# Patient Record
Sex: MALE | Race: WHITE | NOT HISPANIC OR LATINO | Employment: OTHER | RURAL
[De-identification: names, ages, dates, MRNs, and addresses within clinical notes are randomized per-mention and may not be internally consistent; named-entity substitution may affect disease eponyms.]

---

## 2019-09-04 ENCOUNTER — HISTORICAL (OUTPATIENT)
Dept: ADMINISTRATIVE | Facility: HOSPITAL | Age: 59
End: 2019-09-04

## 2019-09-06 LAB
LAB AP CLINICAL INFORMATION: NORMAL
LAB AP COMMENTS: NORMAL
LAB AP DIAGNOSIS - HISTORICAL: NORMAL
LAB AP GROSS PATHOLOGY - HISTORICAL: NORMAL
LAB AP SPECIMEN SUBMITTED - HISTORICAL: NORMAL

## 2021-10-08 ENCOUNTER — OFFICE VISIT (OUTPATIENT)
Dept: UROLOGY | Facility: CLINIC | Age: 61
End: 2021-10-08
Payer: OTHER GOVERNMENT

## 2021-10-08 VITALS
BODY MASS INDEX: 28.44 KG/M2 | HEIGHT: 72 IN | HEART RATE: 72 BPM | TEMPERATURE: 99 F | OXYGEN SATURATION: 99 % | WEIGHT: 210 LBS | DIASTOLIC BLOOD PRESSURE: 80 MMHG | SYSTOLIC BLOOD PRESSURE: 110 MMHG

## 2021-10-08 DIAGNOSIS — Z12.5 ENCOUNTER FOR PROSTATE CANCER SCREENING: ICD-10-CM

## 2021-10-08 DIAGNOSIS — N40.1 BPH WITH OBSTRUCTION/LOWER URINARY TRACT SYMPTOMS: ICD-10-CM

## 2021-10-08 DIAGNOSIS — R30.0 DYSURIA: ICD-10-CM

## 2021-10-08 DIAGNOSIS — R97.20 ELEVATED PROSTATE SPECIFIC ANTIGEN (PSA): Primary | ICD-10-CM

## 2021-10-08 DIAGNOSIS — N13.8 BPH WITH OBSTRUCTION/LOWER URINARY TRACT SYMPTOMS: ICD-10-CM

## 2021-10-08 DIAGNOSIS — K64.4 INFLAMED EXTERNAL HEMORRHOID: ICD-10-CM

## 2021-10-08 LAB
BACTERIA #/AREA URNS HPF: NORMAL /HPF
BILIRUB UR QL STRIP: NEGATIVE
CLARITY UR: CLEAR
COLOR UR: YELLOW
GLUCOSE UR STRIP-MCNC: NEGATIVE MG/DL
KETONES UR STRIP-SCNC: NEGATIVE MG/DL
LEUKOCYTE ESTERASE UR QL STRIP: NEGATIVE
NITRITE UR QL STRIP: NEGATIVE
PH UR STRIP: 5 PH UNITS
PROT UR QL STRIP: NEGATIVE
PSA SERPL-MCNC: 5.19 NG/ML (ref 0–4.1)
RBC # UR STRIP: ABNORMAL /UL
RBC #/AREA URNS HPF: NORMAL /HPF
SP GR UR STRIP: 1.02
UROBILINOGEN UR STRIP-ACNC: 0.2 MG/DL
WBC #/AREA URNS HPF: NORMAL /HPF

## 2021-10-08 PROCEDURE — 81001 URINALYSIS AUTO W/SCOPE: CPT | Mod: ,,, | Performed by: CLINICAL MEDICAL LABORATORY

## 2021-10-08 PROCEDURE — 99204 OFFICE O/P NEW MOD 45 MIN: CPT | Mod: PBBFAC | Performed by: NURSE PRACTITIONER

## 2021-10-08 PROCEDURE — 99214 PR OFFICE/OUTPT VISIT, EST, LEVL IV, 30-39 MIN: ICD-10-PCS | Mod: S$PBB,,, | Performed by: NURSE PRACTITIONER

## 2021-10-08 PROCEDURE — G0103 PSA, SCREENING: ICD-10-PCS | Mod: ,,, | Performed by: CLINICAL MEDICAL LABORATORY

## 2021-10-08 PROCEDURE — 99214 OFFICE O/P EST MOD 30 MIN: CPT | Mod: S$PBB,,, | Performed by: NURSE PRACTITIONER

## 2021-10-08 PROCEDURE — 36415 COLL VENOUS BLD VENIPUNCTURE: CPT | Mod: ,,, | Performed by: CLINICAL MEDICAL LABORATORY

## 2021-10-08 PROCEDURE — 81001 URINALYSIS, REFLEX TO URINE CULTURE: ICD-10-PCS | Mod: ,,, | Performed by: CLINICAL MEDICAL LABORATORY

## 2021-10-08 PROCEDURE — 36415 PR COLLECTION VENOUS BLOOD,VENIPUNCTURE: ICD-10-PCS | Mod: ,,, | Performed by: CLINICAL MEDICAL LABORATORY

## 2021-10-08 PROCEDURE — G0103 PSA SCREENING: HCPCS | Mod: ,,, | Performed by: CLINICAL MEDICAL LABORATORY

## 2021-10-08 PROCEDURE — 51798 US URINE CAPACITY MEASURE: CPT | Mod: PBBFAC | Performed by: NURSE PRACTITIONER

## 2021-10-08 RX ORDER — LISINOPRIL 10 MG/1
TABLET ORAL
COMMUNITY
Start: 2021-09-06

## 2021-10-08 RX ORDER — VIBEGRON 75 MG/1
75 TABLET, FILM COATED ORAL NIGHTLY
Qty: 30 TABLET | Refills: 11 | Status: SHIPPED | OUTPATIENT
Start: 2021-10-08 | End: 2021-10-29 | Stop reason: ALTCHOICE

## 2021-10-08 RX ORDER — ATORVASTATIN CALCIUM 20 MG/1
TABLET, FILM COATED ORAL
COMMUNITY
Start: 2021-08-06

## 2021-10-08 RX ORDER — TAMSULOSIN HYDROCHLORIDE 0.4 MG/1
CAPSULE ORAL
COMMUNITY
Start: 2021-08-06 | End: 2021-10-26

## 2021-10-13 ENCOUNTER — PATIENT MESSAGE (OUTPATIENT)
Dept: UROLOGY | Facility: CLINIC | Age: 61
End: 2021-10-13
Payer: OTHER GOVERNMENT

## 2021-10-13 ENCOUNTER — TELEPHONE (OUTPATIENT)
Dept: UROLOGY | Facility: CLINIC | Age: 61
End: 2021-10-13

## 2021-10-13 DIAGNOSIS — R97.20 ELEVATED PROSTATE SPECIFIC ANTIGEN (PSA): Primary | ICD-10-CM

## 2021-10-14 ENCOUNTER — TELEPHONE (OUTPATIENT)
Dept: UROLOGY | Facility: CLINIC | Age: 61
End: 2021-10-14

## 2021-10-21 ENCOUNTER — TELEPHONE (OUTPATIENT)
Dept: DIABETES SERVICES | Facility: CLINIC | Age: 61
End: 2021-10-21

## 2021-10-29 ENCOUNTER — TELEPHONE (OUTPATIENT)
Dept: UROLOGY | Facility: CLINIC | Age: 61
End: 2021-10-29
Payer: OTHER GOVERNMENT

## 2021-10-29 DIAGNOSIS — N13.8 BPH WITH OBSTRUCTION/LOWER URINARY TRACT SYMPTOMS: Primary | ICD-10-CM

## 2021-10-29 DIAGNOSIS — N40.1 BPH WITH OBSTRUCTION/LOWER URINARY TRACT SYMPTOMS: Primary | ICD-10-CM

## 2021-10-29 RX ORDER — MIRABEGRON 50 MG/1
50 TABLET, FILM COATED, EXTENDED RELEASE ORAL DAILY
Qty: 90 TABLET | Refills: 3 | Status: SHIPPED | OUTPATIENT
Start: 2021-10-29 | End: 2021-11-10 | Stop reason: ALTCHOICE

## 2021-11-05 ENCOUNTER — TELEPHONE (OUTPATIENT)
Dept: UROLOGY | Facility: CLINIC | Age: 61
End: 2021-11-05
Payer: OTHER GOVERNMENT

## 2021-11-10 ENCOUNTER — PATIENT MESSAGE (OUTPATIENT)
Dept: UROLOGY | Facility: CLINIC | Age: 61
End: 2021-11-10
Payer: OTHER GOVERNMENT

## 2021-11-10 DIAGNOSIS — N13.8 BPH WITH OBSTRUCTION/LOWER URINARY TRACT SYMPTOMS: ICD-10-CM

## 2021-11-10 DIAGNOSIS — N40.1 BPH WITH OBSTRUCTION/LOWER URINARY TRACT SYMPTOMS: ICD-10-CM

## 2021-11-10 DIAGNOSIS — N32.81 OVERACTIVE BLADDER: Primary | ICD-10-CM

## 2021-11-10 RX ORDER — VIBEGRON 75 MG/1
75 TABLET, FILM COATED ORAL DAILY
Qty: 90 TABLET | Refills: 3 | Status: SHIPPED | OUTPATIENT
Start: 2021-11-10 | End: 2022-04-28

## 2021-12-06 ENCOUNTER — OFFICE VISIT (OUTPATIENT)
Dept: UROLOGY | Facility: CLINIC | Age: 61
End: 2021-12-06
Payer: OTHER GOVERNMENT

## 2021-12-06 VITALS
TEMPERATURE: 99 F | WEIGHT: 210 LBS | BODY MASS INDEX: 28.44 KG/M2 | OXYGEN SATURATION: 97 % | HEART RATE: 80 BPM | DIASTOLIC BLOOD PRESSURE: 75 MMHG | SYSTOLIC BLOOD PRESSURE: 122 MMHG | HEIGHT: 72 IN

## 2021-12-06 DIAGNOSIS — R97.20 ELEVATED PROSTATE SPECIFIC ANTIGEN (PSA): ICD-10-CM

## 2021-12-06 DIAGNOSIS — N13.8 BPH WITH OBSTRUCTION/LOWER URINARY TRACT SYMPTOMS: Primary | ICD-10-CM

## 2021-12-06 DIAGNOSIS — N40.1 BPH WITH OBSTRUCTION/LOWER URINARY TRACT SYMPTOMS: Primary | ICD-10-CM

## 2021-12-06 PROCEDURE — 99214 PR OFFICE/OUTPT VISIT, EST, LEVL IV, 30-39 MIN: ICD-10-PCS | Mod: S$PBB,,, | Performed by: NURSE PRACTITIONER

## 2021-12-06 PROCEDURE — 99214 OFFICE O/P EST MOD 30 MIN: CPT | Mod: S$PBB,,, | Performed by: NURSE PRACTITIONER

## 2021-12-06 PROCEDURE — 99214 OFFICE O/P EST MOD 30 MIN: CPT | Mod: PBBFAC | Performed by: NURSE PRACTITIONER

## 2022-01-03 ENCOUNTER — PATIENT MESSAGE (OUTPATIENT)
Dept: UROLOGY | Facility: CLINIC | Age: 62
End: 2022-01-03
Payer: OTHER GOVERNMENT

## 2022-01-12 ENCOUNTER — OFFICE VISIT (OUTPATIENT)
Dept: UROLOGY | Facility: CLINIC | Age: 62
End: 2022-01-12
Payer: OTHER GOVERNMENT

## 2022-01-12 VITALS
HEIGHT: 72 IN | TEMPERATURE: 99 F | OXYGEN SATURATION: 99 % | SYSTOLIC BLOOD PRESSURE: 112 MMHG | WEIGHT: 220 LBS | HEART RATE: 66 BPM | BODY MASS INDEX: 29.8 KG/M2 | DIASTOLIC BLOOD PRESSURE: 70 MMHG

## 2022-01-12 DIAGNOSIS — N13.8 BPH WITH OBSTRUCTION/LOWER URINARY TRACT SYMPTOMS: Primary | ICD-10-CM

## 2022-01-12 DIAGNOSIS — R97.20 ELEVATED PROSTATE SPECIFIC ANTIGEN (PSA): ICD-10-CM

## 2022-01-12 DIAGNOSIS — N40.1 BPH WITH OBSTRUCTION/LOWER URINARY TRACT SYMPTOMS: Primary | ICD-10-CM

## 2022-01-12 PROCEDURE — 99214 PR OFFICE/OUTPT VISIT, EST, LEVL IV, 30-39 MIN: ICD-10-PCS | Mod: S$PBB,,, | Performed by: NURSE PRACTITIONER

## 2022-01-12 PROCEDURE — 99214 OFFICE O/P EST MOD 30 MIN: CPT | Mod: S$PBB,,, | Performed by: NURSE PRACTITIONER

## 2022-01-12 PROCEDURE — 51798 US URINE CAPACITY MEASURE: CPT | Mod: PBBFAC | Performed by: NURSE PRACTITIONER

## 2022-01-12 PROCEDURE — 99213 OFFICE O/P EST LOW 20 MIN: CPT | Mod: PBBFAC | Performed by: NURSE PRACTITIONER

## 2022-01-12 NOTE — PROGRESS NOTES
"Subjective:       Patient ID: Flako Koch is a 61 y.o. male.    Chief Complaint: Other (3 month f/u for BPH with LUTS--did not bring meds)    PAST UROLOGICAL HISTORY:  UROLOGICAL HISTORY  56 yo newly referred male here with voiding complaints x 6 months.  States he was initially placed on alfuzosin for nocturia, but  became ineffective and he was changed to flomax by his PMD.  PVR 105ML on arrival.  Last urine culture negative for infection;  PSA 11/28/17 2.3.  AUA-SI score of 8.  Denies fever, chills or constitutional sxs.  No significant findings on urine dip.  (March 6, 2018)    Mr. Koch is here for his one month f/u appt after treatment for acute prostatitis.  He finished one-month course of Cipro 500mg as directed and repeat PSA  improved from 3.340 to 2.800.  Flomax started last visit.  He states sxs have improved about 50%, as nocturia down to 2 from 4.  However he presents today with 1+ hematuria which he did not have last visit.  (April 9, 2018)    Patient returns today for one month f/u appt for continued tx of exacerbation of his prostatitis.  He improved "50%" with month of Cipro.  He has just finished another month of antx (Minocycline) without additional improvement.  Hematuria appears to have cleared.  Nocturia unchanged, frequency manageable during the day with urgency.  Urination is aggrevating more than painful.  Denies  pain other than back pain.  He agrees to cystoscopy for further evaluation.  PVR 20 ml today.  (May 16, 2018)    Mr. Koch is here today for his scheduled cystoscopy with Dr. Johnson for microhematuria and evaluation of BPH with BLEVINS.  He has had two month-courses of antx (cipro and minocycline) for his probable chronic prostatitism, and Myrbetriq trial started last visit has been effective for him.  Dr. Johnson to room to speak with patient.  (June 20, 2018)    Mr. Koch is here today for his routine yearly prostate screening.  PSA is still pending.  Cystoscopy at last " "appt resulted chronic prostatism as the most likely cause of his microhematuria.  He has some middle lobe and overall prostate enlargement.  He will be candidate for TURP when and if meds become ineffective.  He has continued to take Flomax and Myrbetriq 50mg daily.  (6/26/2019)    Mr. Koch is here today for recheck elevated PSA of 5.840.  He was treated for a probable exacerbation of his chronic prostatitis with Bactrim DS for one month.  He did not get his PSA prior to visit today, but will on his way out.  He denies changes in voiding following tx, and denies pain of any sort.  His urinary sxs are well-controlled on Myrbetriq and Flomax at this point.  He is hypotensive this morning, but denies any symptoms and states he "actually feels pretty good".  (Aug. 8, 2019)    Mr. Koch is here today for 6 month f/u for elevated PSA.  Prostate biopsies performed by Dr. Johnson 9/4/19 showed BPH only.  Last PSA performed 8/8/2019 was 6.130.  Patient states he feels well, and voiding is controlled.  He ran out of Myrbetriq about 1 week ago, but states he is doing well without it, and questions if it is necessary anymore.  Patient is retiring in May, and will be changing his insurance to [employer].  He requests refills.  (March 12, 2020)-----------------------------------------------------------------    Mr. Koch has rtc today for his routine prostate screening and 6 month f/u PSA for elevation.  He has hx of trilobar BPH per prostate biopsies, and is currently controlled on  Flomax daily.  He states he discontinued Myrbetriq 50mg when he found that it was not covered by his insurance, but experienced no change in urination after doing so.  Also denies new sxs or changes since last visit.  (July 14, 2020)-----------------------------------------------------    Mr. Koch has rtc today for annual prostate screening.  He has a history of known BPH with LUTS per prostate biopsies for chronically elevated PSA 9/4/2019.  " He did not get PSA prior to visit today, but will on his way out.  He states flomax daily is not working as well as it had in the past.  IPSS 7, which reflects c/o frequency and nocturia.  PVR 47 ml.  Prostate exam was asymptomatic for prostatitis, but he has significant internal and external hemorrhoids on exam.    Past PSA Results   PSA WAS 3.310 03/12/2019  PSA was 6.130 on August 8, 2019  PSA was 5.480 on 06/269/2019  2.800 onn 04/09/2018  2.3 on 11/28/2017  3.340 on 09/20/2017  2.620 on 10/10/2016  (10/8/2021)--------------------------------------------------------------------------------------    Mr. Koch has returned to clinic today to discuss MRI Prostate results and proceeding POC.  He reports that Gemtesa has improved his nocturia and OAB symproms slightly.  Would like to finish his trial to give it more time.  PI-RADS 2, which translates to low clinically significant cancer likely to be present.  No biopsies recommended, but he has large prostate with chronic bladder outlet obstruction.  Patient is not interested in Proscar r/t side effects, and is not a candidate for Urolift, but has interest in TURP.  He would like to wait until after the holidays however.  He denies sensation of infection today.        6/20/2018 Procedure Performed by Dr. Johnson  Flexible cystoscopy    Preoperative diagnosis:  Microscopic hematuria undetermined etiology    Postoperative diagnosis:  Microscopic hematuria secondary to benign prostatic hyperplasia with chronic prostatitis    Description:  The patient was placed in the supine position in the clinic cystoscopy room. He was prepared for flexible cystoscopy. Urethra was anesthetized with lidocaine jelly. No sedation was given. The 16.5 [place] Olympus flexible digital cystoscope was passed transurethrally into the bladder. Anterior urethra was clear. Posterior urethra was extremely inflamed beginning at the external sphincter extending all the way into the bladder neck.  There was a lot of enlargement of the prostate with trilobar overgrowth with some middle lobe hyperplasia and high sitting bladder neck. Entire prostatic urethra was extremely inflamed. There did appear to be obstruction from the prostate. The total length of the prostate was felt to be about 3.5 cm with functional length of 2.5 cm. Bladder was moderately trabeculated. There were a lot of petechial changes on the posterior wall. Orifices were difficult to see looking forward but were easily seen in retrograde fashion. Retrograde viewing of the bladder neck revealed some intravesical extension of the middle lobe but it did not seem to be excessively large. Bladder capacity was in excess of 300 mL. I did not see any evidence of tumors, stones, or diverticula or any other cause for the hematuria other than the inflammatory change of the prostatic urethra and the petechial changes in the bladder. The scope was removed and again the inflammation and enlargement of prostatic urethra noted. Patient was returned to the regular exam room in satisfactory condition. There were no complications. I feel that he has benign hematuria but does have a very inflamed and somewhat enlarged prostate.  (12/6/2021)-----------------------------------------------------------------------------------    Mr. Koch is here today for f/u uncontrolled BPH with LUTS elevated PSA.  He originally made appt to meet with Dr Johnson and schedule recommended TURP, but after lengthy discussion would like to take more time before scheduling.  All questions answered, and patient sent with patient education on procedure and aftercare.  He states he will call us back.  He denies urological complaints at this time.      -  Elevated prostate specific antigen (PSA)  MRI Prostate resulted .  PI-RADS version 2.1 score: 2.  Findings of BPH. Some trabeculation of the urinary bladder wall which can be seen in the setting of chronic outlet obstruction.   (1/12/2021)------------------------------------------------------        Review of Systems   Constitutional: Negative.    Respiratory: Negative.    Cardiovascular: Negative.    Gastrointestinal: Negative.    Genitourinary: Positive for difficulty urinating, frequency and urgency. Negative for decreased urine volume, dysuria, enuresis, flank pain, genital sores, hematuria, penile discharge, penile pain, penile swelling, scrotal swelling and testicular pain.        Nocturia x 3   Musculoskeletal: Negative.    Skin: Negative.    Allergic/Immunologic: Negative.    Neurological: Negative.        Objective:      Physical Exam  Vitals and nursing note reviewed.   Constitutional:       General: He is not in acute distress.     Appearance: Normal appearance. He is not ill-appearing, toxic-appearing or diaphoretic.   Eyes:      General: No scleral icterus.  Cardiovascular:      Rate and Rhythm: Normal rate.   Pulmonary:      Effort: Pulmonary effort is normal.   Abdominal:      General: There is no distension.      Palpations: Abdomen is soft.      Tenderness: There is no abdominal tenderness. There is guarding. There is no right CVA tenderness or left CVA tenderness.   Musculoskeletal:         General: Normal range of motion.   Skin:     General: Skin is warm and dry.      Coloration: Skin is not jaundiced or pale.      Findings: No rash.   Neurological:      General: No focal deficit present.      Mental Status: He is alert and oriented to person, place, and time. Mental status is at baseline.   Psychiatric:         Mood and Affect: Mood normal.         Behavior: Behavior normal.         Thought Content: Thought content normal.         Judgment: Judgment normal.         Assessment:       1. BPH with obstruction/lower urinary tract symptoms    2. Elevated prostate specific antigen (PSA)        Plan:       BPH with obstruction/lower urinary tract symptoms  · Dr. Johnson to room to discuss TURP procedure.  Patient elects to take  home patient education and review with his spouse before making decision.    · Continue flomax qhs    Elevated prostate specific antigen (PSA)  MRI performed PI RADS 2  No Bx indicated

## 2022-01-12 NOTE — PATIENT INSTRUCTIONS
Patient Education       Transurethral Resection of the Prostate Discharge Instructions   About this topic   The prostate is a gland at the base of the bladder. Sometimes, the prostate may become too large. This makes it hard for urine to flow out of the bladder opening. Your doctor can remove the inner part of the prostate to make passing urine easier. This procedure is a transurethral resection of the prostate or TURP. TURP removes part of the prostate gland.     What care is needed at home?   · Ask your doctor what you need to do when you go home. Make sure you ask questions if you do not understand what you need to do.  · You need to limit your activity and rest after the procedure for 1 to 2 days. Your doctor will tell you when you can return to your normal activity level.  · You need to drink 6 to 8 glasses of water each day. Drinking water is very important if your urine becomes red or you pass blood clots. Red urine means your treated area is bleeding.  · Your doctor will give you drugs to prevent infection. Follow the instructions given to you with the drugs.  · You need to keep your penis clean to prevent infection. Wash your penis with soap and water at least two times each day.  · Avoid straining or lifting heavy objects until your doctor tells you everything is healed.  What follow-up care is needed?   · Your doctor may ask you to make visits to the office to check on your progress. Be sure to keep your visits.  · Your doctor may send the cut tissue to a lab for testing. Ask your doctor when you can get the results.  · The urine catheter will be removed. Do not try to take your catheter out by yourself.  What drugs may be needed?   The doctor may order drugs to:  · Relieve spasms  · Prevent infection  · Control your pain  Will physical activity be limited?   · You will need to limit your activity while the urine catheter is in place. Talk to your doctor about when you can go back to work and  driving.  · You need to limit your sexual activity after the procedure for about 4 to 6 weeks.  · Try to go for regular short walks.  · Do not lift things over 10 pounds (4.5 kg).  What changes to diet are needed?   · Eat foods high in fiber like fruit, bran, cereal, and beans. Fiber will help prevent hard stools and straining.  · Avoid coffee, soft drinks, and beer, wine, or mixed drinks (alcohol).  What problems could happen?   · Infection. Urinary tract infection is the most common.  · Passing the semen into the bladder instead of out through the urethra  · Injury to the urethra, causing growth of scar tissue  · Loss of fertility. If you plan on having children, talk to your doctor before your procedure.  · Problem with urine control, which is rarely long-lasting.  · Need for another TURP procedure, either because the signs never improved or simply return over time as the prostate continues to get bigger.  · Problem with erection, which is rare.  · Bleeding or small blood clots in the urine  · A hole in the bladder  · Reduced sexual activity  · Blood clots  When do I need to call the doctor?   · Signs of infection such as a fever of 100.4°F (38°C) or higher, chills, mouth sores, wound that will not heal, or anal itching or pain.  · Very bad pain in the belly that cannot be treated by pain relievers  · Not able to drink or eat  · Problems with your urine such as thick, yellow, green, or milky drainage; burning feeling when you pass urine; little urine or no urine at all; urine smells bad.  · Passing large clots the size of a quarter or worsening blood in the urine  · Unable to pass urine and a feeling of fullness  · You are not feeling better in 2 to 3 days or you are feeling worse  If you go home with a tube in your urethra, call your doctor for these signs:  · Worsening pain near the catheter  · Leaking urine  · Not passing urine  · More blood in your urine  · Tube seems blocked or you see grit or stones in your  urine  · Catheter falls out  Helpful tips   · Always keep the tube's drainage bag below the level of your bladder.  · Avoid loops in catheter's drainage tubing.  · You may take showers. Do not take baths until the tube is removed.  Teach Back: Helping You Understand   The Teach Back Method helps you understand the information we are giving you. After you talk with the staff, tell them in your own words what you learned. This helps to make sure the staff has described each thing clearly. It also helps to explain things that may have been confusing. Before going home, make sure you can do these:  · I can tell you about my procedure.  · I can tell you how to care for my catheter, if I have one.  · I can tell you what I will do if I have a fever, chills, or problems with my catheter or urine.  Where can I learn more?   [employer]  https://www.baus.org.uk/_userfiles/pages/files/Patients/Leaflets/TURP%20for%20benign.pdf   [facility] Choices  http://www.nhs.uk/Conditions/resectionoftheprostate/Pages/Recovery.aspx   Last Reviewed Date   2021-09-10  Consumer Information Use and Disclaimer   This information is not specific medical advice and does not replace information you receive from your health care provider. This is only a brief summary of general information. It does NOT include all information about conditions, illnesses, injuries, tests, procedures, treatments, therapies, discharge instructions or life-style choices that may apply to you. You must talk with your health care provider for complete information about your health and treatment options. This information should not be used to decide whether or not to accept your health care providers advice, instructions or recommendations. Only your health care provider has the knowledge and training to provide advice that is right for you.  Copyright   Copyright © 2021 [employer] and its affiliates and/or licensors. All rights reserved.

## 2022-01-13 NOTE — ASSESSMENT & PLAN NOTE
· Dr. Johnson to room to discuss TURP procedure.  Patient elects to take home patient education and review with his spouse before making decision.    · Continue flomax qhs

## 2022-04-13 ENCOUNTER — PATIENT MESSAGE (OUTPATIENT)
Dept: UROLOGY | Facility: CLINIC | Age: 62
End: 2022-04-13
Payer: OTHER GOVERNMENT

## 2022-04-28 ENCOUNTER — OFFICE VISIT (OUTPATIENT)
Dept: UROLOGY | Facility: CLINIC | Age: 62
End: 2022-04-28
Payer: OTHER GOVERNMENT

## 2022-04-28 VITALS
OXYGEN SATURATION: 97 % | BODY MASS INDEX: 29.8 KG/M2 | SYSTOLIC BLOOD PRESSURE: 111 MMHG | HEART RATE: 79 BPM | WEIGHT: 220 LBS | DIASTOLIC BLOOD PRESSURE: 69 MMHG | HEIGHT: 72 IN | TEMPERATURE: 98 F

## 2022-04-28 DIAGNOSIS — N40.1 BPH WITH OBSTRUCTION/LOWER URINARY TRACT SYMPTOMS: ICD-10-CM

## 2022-04-28 DIAGNOSIS — N13.8 BPH WITH OBSTRUCTION/LOWER URINARY TRACT SYMPTOMS: ICD-10-CM

## 2022-04-28 DIAGNOSIS — R97.20 ELEVATED PROSTATE SPECIFIC ANTIGEN (PSA): Primary | ICD-10-CM

## 2022-04-28 PROBLEM — K64.4 INFLAMED EXTERNAL HEMORRHOID: Status: RESOLVED | Noted: 2021-10-08 | Resolved: 2022-04-28

## 2022-04-28 PROCEDURE — 99214 OFFICE O/P EST MOD 30 MIN: CPT | Mod: S$PBB,,, | Performed by: NURSE PRACTITIONER

## 2022-04-28 PROCEDURE — 99214 PR OFFICE/OUTPT VISIT, EST, LEVL IV, 30-39 MIN: ICD-10-PCS | Mod: S$PBB,,, | Performed by: NURSE PRACTITIONER

## 2022-04-28 PROCEDURE — 99214 OFFICE O/P EST MOD 30 MIN: CPT | Mod: PBBFAC | Performed by: NURSE PRACTITIONER

## 2022-04-28 RX ORDER — DICLOFENAC SODIUM 75 MG/1
1 TABLET, DELAYED RELEASE ORAL DAILY
COMMUNITY
Start: 2022-03-23

## 2022-04-28 NOTE — PROGRESS NOTES
"Subjective:       Patient ID: Flako Koch is a 61 y.o. male.    Chief Complaint: Follow-up    PAST UROLOGICAL HISTORY:  UROLOGICAL HISTORY  58 yo newly referred male here with voiding complaints x 6 months.  States he was initially placed on alfuzosin for nocturia, but  became ineffective and he was changed to flomax by his PMD.  PVR 105ML on arrival.  Last urine culture negative for infection;  PSA 11/28/17 2.3.  AUA-SI score of 8.  Denies fever, chills or constitutional sxs.  No significant findings on urine dip.  (March 6, 2018)    Mr. Koch is here for his one month f/u appt after treatment for acute prostatitis.  He finished one-month course of Cipro 500mg as directed and repeat PSA  improved from 3.340 to 2.800.  Flomax started last visit.  He states sxs have improved about 50%, as nocturia down to 2 from 4.  However he presents today with 1+ hematuria which he did not have last visit.  (April 9, 2018)    Patient returns today for one month f/u appt for continued tx of exacerbation of his prostatitis.  He improved "50%" with month of Cipro.  He has just finished another month of antx (Minocycline) without additional improvement.  Hematuria appears to have cleared.  Nocturia unchanged, frequency manageable during the day with urgency.  Urination is aggrevating more than painful.  Denies  pain other than back pain.  He agrees to cystoscopy for further evaluation.  PVR 20 ml today.  (May 16, 2018)    Mr. Koch is here today for his scheduled cystoscopy with Dr. Johnson for microhematuria and evaluation of BPH with BLEVINS.  He has had two month-courses of antx (cipro and minocycline) for his probable chronic prostatitism, and Myrbetriq trial started last visit has been effective for him.  Dr. Johnson to room to speak with patient.  (June 20, 2018)    Mr. Koch is here today for his routine yearly prostate screening.  PSA is still pending.  Cystoscopy at last appt resulted chronic prostatism as the most " "likely cause of his microhematuria.  He has some middle lobe and overall prostate enlargement.  He will be candidate for TURP when and if meds become ineffective.  He has continued to take Flomax and Myrbetriq 50mg daily.  (6/26/2019)    Mr. Koch is here today for recheck elevated PSA of 5.840.  He was treated for a probable exacerbation of his chronic prostatitis with Bactrim DS for one month.  He did not get his PSA prior to visit today, but will on his way out.  He denies changes in voiding following tx, and denies pain of any sort.  His urinary sxs are well-controlled on Myrbetriq and Flomax at this point.  He is hypotensive this morning, but denies any symptoms and states he "actually feels pretty good".  (Aug. 8, 2019)    Mr. Koch is here today for 6 month f/u for elevated PSA.  Prostate biopsies performed by Dr. Johnson 9/4/19 showed BPH only.  Last PSA performed 8/8/2019 was 6.130.  Patient states he feels well, and voiding is controlled.  He ran out of Myrbetriq about 1 week ago, but states he is doing well without it, and questions if it is necessary anymore.  Patient is retiring in May, and will be changing his insurance to [employer].  He requests refills.  (March 12, 2020)-----------------------------------------------------------------    Mr. Koch has rtc today for his routine prostate screening and 6 month f/u PSA for elevation.  He has hx of trilobar BPH per prostate biopsies, and is currently controlled on  Flomax daily.  He states he discontinued Myrbetriq 50mg when he found that it was not covered by his insurance, but experienced no change in urination after doing so.  Also denies new sxs or changes since last visit.  (July 14, 2020)-----------------------------------------------------    Mr. Koch has rtc today for annual prostate screening.  He has a history of known BPH with LUTS per prostate biopsies for chronically elevated PSA 9/4/2019.  He did not get PSA prior to visit today, but " will on his way out.  He states flomax daily is not working as well as it had in the past.  IPSS 7, which reflects c/o frequency and nocturia.  PVR 47 ml.  Prostate exam was asymptomatic for prostatitis, but he has significant internal and external hemorrhoids on exam.    Past PSA Results   PSA WAS 3.310 03/12/2019  PSA was 6.130 on August 8, 2019  PSA was 5.480 on 06/269/2019  2.800 onn 04/09/2018  2.3 on 11/28/2017  3.340 on 09/20/2017  2.620 on 10/10/2016  (10/8/2021)--------------------------------------------------------------------------------------    Mr. Koch has returned to clinic today to discuss MRI Prostate results and proceeding POC.  He reports that Gemtesa has improved his nocturia and OAB symproms slightly.  Would like to finish his trial to give it more time.  PI-RADS 2, which translates to low clinically significant cancer likely to be present.  No biopsies recommended, but he has large prostate with chronic bladder outlet obstruction.  Patient is not interested in Proscar r/t side effects, and is not a candidate for Urolift, but has interest in TURP.  He would like to wait until after the holidays however.  He denies sensation of infection today.        6/20/2018 Procedure Performed by Dr. Johnson  Flexible cystoscopy    Preoperative diagnosis:  Microscopic hematuria undetermined etiology    Postoperative diagnosis:  Microscopic hematuria secondary to benign prostatic hyperplasia with chronic prostatitis    Description:  The patient was placed in the supine position in the clinic cystoscopy room. He was prepared for flexible cystoscopy. Urethra was anesthetized with lidocaine jelly. No sedation was given. The 16.5 English Olympus flexible digital cystoscope was passed transurethrally into the bladder. Anterior urethra was clear. Posterior urethra was extremely inflamed beginning at the external sphincter extending all the way into the bladder neck. There was a lot of enlargement of the prostate  with trilobar overgrowth with some middle lobe hyperplasia and high sitting bladder neck. Entire prostatic urethra was extremely inflamed. There did appear to be obstruction from the prostate. The total length of the prostate was felt to be about 3.5 cm with functional length of 2.5 cm. Bladder was moderately trabeculated. There were a lot of petechial changes on the posterior wall. Orifices were difficult to see looking forward but were easily seen in retrograde fashion. Retrograde viewing of the bladder neck revealed some intravesical extension of the middle lobe but it did not seem to be excessively large. Bladder capacity was in excess of 300 mL. I did not see any evidence of tumors, stones, or diverticula or any other cause for the hematuria other than the inflammatory change of the prostatic urethra and the petechial changes in the bladder. The scope was removed and again the inflammation and enlargement of prostatic urethra noted. Patient was returned to the regular exam room in satisfactory condition. There were no complications. I feel that he has benign hematuria but does have a very inflamed and somewhat enlarged prostate.  (12/6/2021)-----------------------------------------------------------------------------------    Mr. Koch is here today for f/u uncontrolled BPH with LUTS elevated PSA.  He originally made appt to meet with Dr Johnson and schedule recommended TURP, but after lengthy discussion would like to take more time before scheduling.  All questions answered, and patient sent with patient education on procedure and aftercare.  He states he will call us back.  He denies urological complaints at this time.      -  Elevated prostate specific antigen (PSA)  MRI Prostate resulted .  PI-RADS version 2.1 score: 2.  Findings of BPH. Some trabeculation of the urinary bladder wall which can be seen in the setting of chronic outlet obstruction.   (1/12/2021)------------------------------------------------------    Mr. Koch has returned today for routine 6 month f/u appt for BPH with BLEVINS and elevated PSA:    -  BPH with obstruction/LUTS:  Dr. Johnson to room to discuss TURP procedure last visit.  Patient elects to proceed with scheduling in 2 months after his scheduled vacations.  Currently taking flomax qhs    -  Elevated prostate specific antigen (PSA):  Did not get PSA prior to visit today.  MRI performed PI RADS 2  No Bx indicated  (4/28/2022)-----------------------------------------------------             Review of Systems   Constitutional: Negative.    Respiratory: Negative.    Cardiovascular: Negative.    Gastrointestinal: Negative.    Genitourinary: Positive for difficulty urinating, frequency and urgency. Negative for decreased urine volume, dysuria, enuresis, flank pain, genital sores, hematuria, penile discharge, penile pain, penile swelling, scrotal swelling and testicular pain.        Nocturia x 3   Musculoskeletal: Negative.    Skin: Negative.    Allergic/Immunologic: Negative.    Neurological: Negative.        Objective:      Physical Exam  Vitals and nursing note reviewed.   Constitutional:       General: He is not in acute distress.     Appearance: Normal appearance. He is not ill-appearing, toxic-appearing or diaphoretic.   Eyes:      General: No scleral icterus.  Cardiovascular:      Rate and Rhythm: Normal rate.   Pulmonary:      Effort: Pulmonary effort is normal.   Abdominal:      General: There is no distension.      Palpations: Abdomen is soft.      Tenderness: There is no abdominal tenderness. There is guarding. There is no right CVA tenderness or left CVA tenderness.   Musculoskeletal:         General: Normal range of motion.   Skin:     General: Skin is warm and dry.      Coloration: Skin is not jaundiced or pale.      Findings: No rash.   Neurological:      General: No focal deficit present.      Mental Status: He is alert and  oriented to person, place, and time. Mental status is at baseline.   Psychiatric:         Mood and Affect: Mood normal.         Behavior: Behavior normal.         Thought Content: Thought content normal.         Judgment: Judgment normal.         Assessment:       1. Elevated prostate specific antigen (PSA)    2. BPH with obstruction/lower urinary tract symptoms        Plan:       Elevated prostate specific antigen (PSA)  · Continue 6 month PSA's  · PSA before leaving today    BPH with obstruction/lower urinary tract symptoms  · Mr. Koch elects to f/u 2 months for scheduling of TURP  · Myrbetriq 25 mg/50 mg samples given to patient, will reassess sxs at this time.  · PVR 4 ml, continue flomax

## 2022-04-28 NOTE — ASSESSMENT & PLAN NOTE
· Mr. Koch elects to f/u 2 months for scheduling of TURP  · Myrbetriq 25 mg/50 mg samples given to patient, will reassess sxs at this time.  · PVR 4 ml, continue flomax

## 2022-04-29 ENCOUNTER — TELEPHONE (OUTPATIENT)
Dept: UROLOGY | Facility: CLINIC | Age: 62
End: 2022-04-29
Payer: OTHER GOVERNMENT

## 2022-04-29 NOTE — TELEPHONE ENCOUNTER
----- Message from BASILIA Aldana sent at 4/29/2022  6:59 AM CDT -----  PSA improved approximately one point from last visit.  F/u 2 mos as planned for TURP evaluation.  I called and spoke with pt and informed him of the above message.  He said that is great that it improved.  Reminded him of his June appointment with FARRUKH Rose and we will see him then for his TURP evaluation.

## 2022-05-03 ENCOUNTER — PATIENT MESSAGE (OUTPATIENT)
Dept: UROLOGY | Facility: CLINIC | Age: 62
End: 2022-05-03
Payer: OTHER GOVERNMENT

## 2022-05-05 ENCOUNTER — TELEPHONE (OUTPATIENT)
Dept: UROLOGY | Facility: CLINIC | Age: 62
End: 2022-05-05
Payer: OTHER GOVERNMENT

## 2022-05-05 NOTE — TELEPHONE ENCOUNTER
----- Message from Arleen Melton sent at 5/2/2022  3:51 PM CDT -----  Regarding: [facility]call back  [facility] called about this pt said the Myrbetriq 50mg is a step therapy medication and had alternatives oxybuntnin, Detrol LA, Tolterodine and more. And something about a PA. The call back number is [phone]  I called and spoke with the pt.  He says he is on flomax and he is doing OK.  He says he is coming in in 2 months to see her about his TURP, so he will stop Myrbetriq and just come in in 2 months, and that should settle that.  So no further work is needed for the PA on Myrbetriq.

## 2022-05-19 ENCOUNTER — PATIENT MESSAGE (OUTPATIENT)
Dept: UROLOGY | Facility: CLINIC | Age: 62
End: 2022-05-19
Payer: OTHER GOVERNMENT

## 2022-05-27 ENCOUNTER — TELEPHONE (OUTPATIENT)
Dept: UROLOGY | Facility: CLINIC | Age: 62
End: 2022-05-27
Payer: OTHER GOVERNMENT

## 2022-05-27 NOTE — TELEPHONE ENCOUNTER
I called [facility] like pt had messaged us to do.  It was about his Myrbetriq.  They are starting a PA. Will fax the decision to us.  I called pt back and told him this and he says he is not going to worry about Myrbetric because he is planning to have TURP done.  I told him I would call back and cancel the PA start and if it gets approved he should call them and stop them from sending it.  He said he had gone in the system and discontinued the medicine.  He may need to do that again if it gets approved.  I called [facility] back and was placed on hold for a long time and spoke with Pily.   I was given a case number of [id number].  I told her to cancel the PA that was started a few minutes ago, as pt does not want myrbetriq,.  She is canceling the PA.

## 2022-06-23 ENCOUNTER — PATIENT MESSAGE (OUTPATIENT)
Dept: UROLOGY | Facility: CLINIC | Age: 62
End: 2022-06-23
Payer: OTHER GOVERNMENT

## 2023-01-04 ENCOUNTER — PATIENT MESSAGE (OUTPATIENT)
Dept: UROLOGY | Facility: CLINIC | Age: 63
End: 2023-01-04
Payer: OTHER GOVERNMENT

## 2023-01-13 ENCOUNTER — PATIENT MESSAGE (OUTPATIENT)
Dept: UROLOGY | Facility: CLINIC | Age: 63
End: 2023-01-13
Payer: OTHER GOVERNMENT

## 2023-01-17 ENCOUNTER — PATIENT MESSAGE (OUTPATIENT)
Dept: UROLOGY | Facility: CLINIC | Age: 63
End: 2023-01-17
Payer: OTHER GOVERNMENT

## 2023-02-01 ENCOUNTER — OFFICE VISIT (OUTPATIENT)
Dept: UROLOGY | Facility: CLINIC | Age: 63
End: 2023-02-01
Payer: OTHER GOVERNMENT

## 2023-02-01 VITALS
TEMPERATURE: 98 F | OXYGEN SATURATION: 94 % | HEART RATE: 74 BPM | RESPIRATION RATE: 12 BRPM | BODY MASS INDEX: 29.8 KG/M2 | HEIGHT: 72 IN | WEIGHT: 220 LBS | SYSTOLIC BLOOD PRESSURE: 110 MMHG | DIASTOLIC BLOOD PRESSURE: 70 MMHG

## 2023-02-01 DIAGNOSIS — N13.8 BPH WITH OBSTRUCTION/LOWER URINARY TRACT SYMPTOMS: ICD-10-CM

## 2023-02-01 DIAGNOSIS — N40.1 BPH WITH OBSTRUCTION/LOWER URINARY TRACT SYMPTOMS: ICD-10-CM

## 2023-02-01 DIAGNOSIS — R97.20 ELEVATED PROSTATE SPECIFIC ANTIGEN (PSA): Primary | ICD-10-CM

## 2023-02-01 PROCEDURE — 99214 OFFICE O/P EST MOD 30 MIN: CPT | Mod: S$PBB,,, | Performed by: NURSE PRACTITIONER

## 2023-02-01 PROCEDURE — 99214 OFFICE O/P EST MOD 30 MIN: CPT | Mod: PBBFAC | Performed by: NURSE PRACTITIONER

## 2023-02-01 PROCEDURE — 99214 PR OFFICE/OUTPT VISIT, EST, LEVL IV, 30-39 MIN: ICD-10-PCS | Mod: S$PBB,,, | Performed by: NURSE PRACTITIONER

## 2023-02-01 PROCEDURE — 51798 US URINE CAPACITY MEASURE: CPT | Mod: PBBFAC | Performed by: NURSE PRACTITIONER

## 2023-02-01 RX ORDER — ESCITALOPRAM OXALATE 10 MG/1
10 TABLET ORAL DAILY
COMMUNITY

## 2023-02-01 NOTE — PROGRESS NOTES
"Subjective:       Patient ID: Flako Koch is a 62 y.o. male.    Chief Complaint: Other (PSI and TURP surgery)    PAST UROLOGICAL HISTORY:  UROLOGICAL HISTORY  58 yo newly referred male here with voiding complaints x 6 months.  States he was initially placed on alfuzosin for nocturia, but  became ineffective and he was changed to flomax by his PMD.  PVR 105ML on arrival.  Last urine culture negative for infection;  PSA 11/28/17 2.3.  AUA-SI score of 8.  Denies fever, chills or constitutional sxs.  No significant findings on urine dip.  [March 6, 2018]    Mr. Koch is here for his one month f/u appt after treatment for acute prostatitis.  He finished one-month course of Cipro 500mg as directed and repeat PSA  improved from 3.340 to 2.800.  Flomax started last visit.  He states sxs have improved about 50%, as nocturia down to 2 from 4.  However he presents today with 1+ hematuria which he did not have last visit.  [April 9, 2018]    Patient returns today for one month f/u appt for continued tx of exacerbation of his prostatitis.  He improved "50%" with month of Cipro.  He has just finished another month of antx (Minocycline) without additional improvement.  Hematuria appears to have cleared.  Nocturia unchanged, frequency manageable during the day with urgency.  Urination is aggrevating more than painful.  Denies  pain other than back pain.  He agrees to cystoscopy for further evaluation.  PVR 20 ml today.  [May 16, 2018]    Mr. Koch is here today for his scheduled cystoscopy with Dr. Johnson for microhematuria and evaluation of BPH with BLEVINS.  He has had two month-courses of antx (cipro and minocycline) for his probable chronic prostatitism, and Myrbetriq trial started last visit has been effective for him.  Dr. Johnson to room to speak with patient.  [June 20, 2018]    Mr. Koch is here today for his routine yearly prostate screening.  PSA is still pending.  Cystoscopy at last appt resulted chronic " "prostatism as the most likely cause of his microhematuria.  He has some middle lobe and overall prostate enlargement.  He will be candidate for TURP when and if meds become ineffective.  He has continued to take Flomax and Myrbetriq 50mg daily.  [6/26/2019]    Mr. Koch is here today for recheck elevated PSA of 5.840.  He was treated for a probable exacerbation of his chronic prostatitis with Bactrim DS for one month.  He did not get his PSA prior to visit today, but will on his way out.  He denies changes in voiding following tx, and denies pain of any sort.  His urinary sxs are well-controlled on Myrbetriq and Flomax at this point.  He is hypotensive this morning, but denies any symptoms and states he "actually feels pretty good".  [Aug. 8, 2019]    Mr. Koch is here today for 6 month f/u for elevated PSA.  Prostate biopsies performed by Dr. Johnson 9/4/19 showed BPH only.  Last PSA performed 8/8/2019 was 6.130.  Patient states he feels well, and voiding is controlled.  He ran out of Myrbetriq about 1 week ago, but states he is doing well without it, and questions if it is necessary anymore.  Patient is retiring in May, and will be changing his insurance to [employer].  He requests refills.  [March 12, 2020]-----------------------------------------------------------------    Mr. Koch has rtc today for his routine prostate screening and 6 month f/u PSA for elevation.  He has hx of trilobar BPH per prostate biopsies, and is currently controlled on  Flomax daily.  He states he discontinued Myrbetriq 50mg when he found that it was not covered by his insurance, but experienced no change in urination after doing so.  Also denies new sxs or changes since last visit.  [July 14, 2020]-----------------------------------------------------    Mr. Koch has rtc today for annual prostate screening.  He has a history of known BPH with LUTS per prostate biopsies for chronically elevated PSA 9/4/2019.  He did not get PSA " prior to visit today, but will on his way out.  He states flomax daily is not working as well as it had in the past.  IPSS 7, which reflects c/o frequency and nocturia.  PVR 47 ml.  Prostate exam was asymptomatic for prostatitis, but he has significant internal and external hemorrhoids on exam.    Past PSA Results   PSA WAS 3.310 03/12/2019  PSA was 6.130 on August 8, 2019  PSA was 5.480 on 06/269/2019  2.800 onn 04/09/2018  2.3 on 11/28/2017  3.340 on 09/20/2017  2.620 on 10/10/2016  [10/8/2021]--------------------------------------------------------------------------------------    Mr. Koch has returned to clinic today to discuss MRI Prostate results and proceeding POC.  He reports that Gemtesa has improved his nocturia and OAB symproms slightly.  Would like to finish his trial to give it more time.  PI-RADS 2, which translates to low clinically significant cancer likely to be present.  No biopsies recommended, but he has large prostate with chronic bladder outlet obstruction.  Patient is not interested in Proscar r/t side effects, and is not a candidate for Urolift, but has interest in TURP.  He would like to wait until after the holidays however.  He denies sensation of infection today.        6/20/2018 Procedure Performed by Dr. Johnson  Flexible cystoscopy    Preoperative diagnosis:  Microscopic hematuria undetermined etiology    Postoperative diagnosis:  Microscopic hematuria secondary to benign prostatic hyperplasia with chronic prostatitis    Description:  The patient was placed in the supine position in the clinic cystoscopy room. He was prepared for flexible cystoscopy. Urethra was anesthetized with lidocaine jelly. No sedation was given. The 16.5 [place] Olympus flexible digital cystoscope was passed transurethrally into the bladder. Anterior urethra was clear. Posterior urethra was extremely inflamed beginning at the external sphincter extending all the way into the bladder neck. There was a lot of  enlargement of the prostate with trilobar overgrowth with some middle lobe hyperplasia and high sitting bladder neck. Entire prostatic urethra was extremely inflamed. There did appear to be obstruction from the prostate. The total length of the prostate was felt to be about 3.5 cm with functional length of 2.5 cm. Bladder was moderately trabeculated. There were a lot of petechial changes on the posterior wall. Orifices were difficult to see looking forward but were easily seen in retrograde fashion. Retrograde viewing of the bladder neck revealed some intravesical extension of the middle lobe but it did not seem to be excessively large. Bladder capacity was in excess of 300 mL. I did not see any evidence of tumors, stones, or diverticula or any other cause for the hematuria other than the inflammatory change of the prostatic urethra and the petechial changes in the bladder. The scope was removed and again the inflammation and enlargement of prostatic urethra noted. Patient was returned to the regular exam room in satisfactory condition. There were no complications. I feel that he has benign hematuria but does have a very inflamed and somewhat enlarged prostate.  [12/6/2021]-----------------------------------------------------------------------------------    Mr. Koch is here today for f/u uncontrolled BPH with LUTS elevated PSA.  He originally made appt to meet with Dr Johnson and schedule recommended TURP, but after lengthy discussion would like to take more time before scheduling.  All questions answered, and patient sent with patient education on procedure and aftercare.  He states he will call us back.  He denies urological complaints at this time.      -  Elevated prostate specific antigen (PSA)  MRI Prostate resulted .  PI-RADS version 2.1 score: 2.  Findings of BPH. Some trabeculation of the urinary bladder wall which can be seen in the setting of chronic outlet obstruction.   [1/12/2021]------------------------------------------------------    Mr. Koch has returned today for routine 6 month f/u appt for BPH with BLEVINS and elevated PSA:    -  BPH with obstruction/LUTS:  Dr. Johnson to room to discuss TURP procedure last visit.  Patient elects to proceed with scheduling in 2 months after his scheduled vacations.  Currently taking flomax qhs    -  Elevated prostate specific antigen (PSA):  Did not get PSA prior to visit today.  MRI performed PI RADS 2  No Bx indicated  [4/28/2022]-----------------------------------------------------       Mr. Koch is a 61 yo male who is returning today f/u TURP    -  Elevated prostate specific antigen (PSA):  MRI Prostate 10/29/2021:  PI-RADS 2, which translates to low clinically significant cancer likely to be present.  No biopsies recommended, but he has large prostate with chronic bladder outlet obstruction.  Evaluation for TURP or Urolift recommended.  IPSS 2, PVR 22 ml.  PSA RESULTS:                     PSA                      4.180 (H)           04/28/2022                 PSA                      5.190 (H)           10/08/2021               -  BPH with obstruction/lower urinary tract symptoms:  prostate 95 cc per MRI imaging             stopped flomax without return of worsening voiding complaints.             TURP recommended last year, would like to reschedule surgery if needed today   Myrbetriq 25 mg/50 mg samples given in the past were no help  [2/1/2023]--------------------------------------------------------------------------------             Review of Systems   Constitutional: Negative.    Respiratory: Negative.     Cardiovascular: Negative.    Gastrointestinal: Negative.    Genitourinary:  Negative for decreased urine volume, difficulty urinating, dysuria, enuresis, flank pain, frequency, genital sores, hematuria, penile discharge, penile pain, penile swelling, scrotal swelling, testicular pain and urgency.        Nocturia x 1, reports  voiding controlled    Musculoskeletal:  Negative for back pain.   Skin:  Negative for rash.   Neurological:  Negative for dizziness and light-headedness.     Objective:      Physical Exam  Vitals and nursing note reviewed.   Constitutional:       General: He is not in acute distress.     Appearance: Normal appearance. He is not ill-appearing, toxic-appearing or diaphoretic.   Eyes:      General: No scleral icterus.  Cardiovascular:      Rate and Rhythm: Normal rate.   Pulmonary:      Effort: Pulmonary effort is normal.   Abdominal:      General: There is no distension.      Palpations: Abdomen is soft.      Tenderness: There is no abdominal tenderness. There is no right CVA tenderness or left CVA tenderness.   Musculoskeletal:      Right lower leg: No edema.      Left lower leg: No edema.   Skin:     General: Skin is warm and dry.      Coloration: Skin is not jaundiced or pale.      Findings: No rash.   Neurological:      Mental Status: He is alert and oriented to person, place, and time. Mental status is at baseline.   Psychiatric:         Mood and Affect: Mood normal.         Behavior: Behavior normal.         Thought Content: Thought content normal.         Judgment: Judgment normal.       Assessment:       1. Elevated prostate specific antigen (PSA)    2. BPH with obstruction/lower urinary tract symptoms          Plan:       Elevated prostate specific antigen (PSA)  Lab Results   Component Value Date    PSA 4.180 (H) 04/28/2022    PSA 5.190 (H) 10/08/2021   PSA before leaving today.  If still elevated, Dr. Johnson recommends MRI Prostate or office biopsies.  His Grandfather passed from Prostate Cancer.  F/u Dr. Maloney    BPH with obstruction/lower urinary tract symptoms  Dr. Johnson to room to speak with patient.  He recommends not proceeding with TURP since patient is controlled now after discontinuing flomax electively.  discontinue Flomax.  Patient requests referral to Dr. Maloney, [facility] for 6 month f/u 6  months.

## 2023-02-01 NOTE — ASSESSMENT & PLAN NOTE
Lab Results   Component Value Date    PSA 4.180 (H) 04/28/2022    PSA 5.190 (H) 10/08/2021   · PSA before leaving today.  If still elevated, Dr. Johnson recommends MRI Prostate or office biopsies.  His Grandfather passed from Prostate Cancer.  · F/u Dr. Maloney

## 2023-02-01 NOTE — ASSESSMENT & PLAN NOTE
· Dr. Johnson to room to speak with patient.  He recommends not proceeding with TURP since patient is controlled now after discontinuing flomax electively.  · discontinue Flomax.  · Patient requests referral to Dr. Maloney, [facility] for 6 month f/u 6 months.

## 2023-02-07 ENCOUNTER — TELEPHONE (OUTPATIENT)
Dept: UROLOGY | Facility: CLINIC | Age: 63
End: 2023-02-07
Payer: OTHER GOVERNMENT

## 2023-02-07 NOTE — TELEPHONE ENCOUNTER
----- Message from BASILIA Aldana sent at 2/3/2023  4:26 PM CST -----  Alert patient that PSA returned elevated.  He can f/u with this at his repeat PSA and new consult with Dr. Maloney   I called and relayed the above message to the pt.  He voiced understanding.  He says he will call regarding appointment there.

## 2023-08-29 ENCOUNTER — OFFICE VISIT (OUTPATIENT)
Dept: PODIATRY | Facility: CLINIC | Age: 63
End: 2023-08-29
Payer: COMMERCIAL

## 2023-08-29 VITALS
WEIGHT: 189.19 LBS | BODY MASS INDEX: 25.08 KG/M2 | DIASTOLIC BLOOD PRESSURE: 80 MMHG | SYSTOLIC BLOOD PRESSURE: 127 MMHG | HEIGHT: 73 IN | HEART RATE: 67 BPM

## 2023-08-29 DIAGNOSIS — E11.9 COMPREHENSIVE DIABETIC FOOT EXAMINATION, TYPE 2 DM, ENCOUNTER FOR: ICD-10-CM

## 2023-08-29 DIAGNOSIS — M20.42 HAMMER TOE OF LEFT FOOT: Primary | ICD-10-CM

## 2023-08-29 DIAGNOSIS — L84 CORN OR CALLUS: ICD-10-CM

## 2023-08-29 PROCEDURE — 3074F SYST BP LT 130 MM HG: CPT | Mod: CPTII,S$GLB,, | Performed by: PODIATRIST

## 2023-08-29 PROCEDURE — 1159F MED LIST DOCD IN RCRD: CPT | Mod: CPTII,S$GLB,, | Performed by: PODIATRIST

## 2023-08-29 PROCEDURE — 1159F PR MEDICATION LIST DOCUMENTED IN MEDICAL RECORD: ICD-10-PCS | Mod: CPTII,S$GLB,, | Performed by: PODIATRIST

## 2023-08-29 PROCEDURE — 1160F RVW MEDS BY RX/DR IN RCRD: CPT | Mod: CPTII,S$GLB,, | Performed by: PODIATRIST

## 2023-08-29 PROCEDURE — 3008F BODY MASS INDEX DOCD: CPT | Mod: CPTII,S$GLB,, | Performed by: PODIATRIST

## 2023-08-29 PROCEDURE — 3008F PR BODY MASS INDEX (BMI) DOCUMENTED: ICD-10-PCS | Mod: CPTII,S$GLB,, | Performed by: PODIATRIST

## 2023-08-29 PROCEDURE — 3079F PR MOST RECENT DIASTOLIC BLOOD PRESSURE 80-89 MM HG: ICD-10-PCS | Mod: CPTII,S$GLB,, | Performed by: PODIATRIST

## 2023-08-29 PROCEDURE — 3079F DIAST BP 80-89 MM HG: CPT | Mod: CPTII,S$GLB,, | Performed by: PODIATRIST

## 2023-08-29 PROCEDURE — 1160F PR REVIEW ALL MEDS BY PRESCRIBER/CLIN PHARMACIST DOCUMENTED: ICD-10-PCS | Mod: CPTII,S$GLB,, | Performed by: PODIATRIST

## 2023-08-29 PROCEDURE — 3074F PR MOST RECENT SYSTOLIC BLOOD PRESSURE < 130 MM HG: ICD-10-PCS | Mod: CPTII,S$GLB,, | Performed by: PODIATRIST

## 2023-08-29 PROCEDURE — 99203 OFFICE O/P NEW LOW 30 MIN: CPT | Mod: S$GLB,,, | Performed by: PODIATRIST

## 2023-08-29 PROCEDURE — 99203 PR OFFICE/OUTPT VISIT, NEW, LEVL III, 30-44 MIN: ICD-10-PCS | Mod: S$GLB,,, | Performed by: PODIATRIST

## 2023-08-29 RX ORDER — METFORMIN HYDROCHLORIDE 1000 MG/1
1000 TABLET ORAL 2 TIMES DAILY
COMMUNITY
Start: 2023-06-05

## 2023-08-29 RX ORDER — FLUTICASONE PROPIONATE AND SALMETEROL 500; 50 UG/1; UG/1
1 POWDER RESPIRATORY (INHALATION) 2 TIMES DAILY
COMMUNITY
Start: 2023-07-18

## 2023-08-29 RX ORDER — OMEPRAZOLE 20 MG/1
20 CAPSULE, DELAYED RELEASE ORAL
COMMUNITY
Start: 2023-06-27

## 2023-08-29 RX ORDER — TICAGRELOR 90 MG/1
90 TABLET ORAL 2 TIMES DAILY
COMMUNITY
Start: 2023-07-17

## 2023-08-29 RX ORDER — GABAPENTIN 300 MG/1
300 CAPSULE ORAL 3 TIMES DAILY
COMMUNITY
Start: 2023-05-19

## 2023-08-29 RX ORDER — LANCETS 33 GAUGE
EACH MISCELLANEOUS
COMMUNITY
Start: 2023-06-11

## 2023-08-29 RX ORDER — LEVOTHYROXINE SODIUM 25 UG/1
25 TABLET ORAL
COMMUNITY
Start: 2023-07-24

## 2023-08-29 RX ORDER — FAMOTIDINE 40 MG/1
40 TABLET, FILM COATED ORAL
COMMUNITY
Start: 2023-06-09

## 2023-08-29 RX ORDER — BLOOD-GLUCOSE METER
EACH MISCELLANEOUS 2 TIMES DAILY
COMMUNITY
Start: 2023-06-11

## 2023-08-29 RX ORDER — CARVEDILOL 6.25 MG/1
6.25 TABLET ORAL 2 TIMES DAILY
COMMUNITY
Start: 2023-07-17

## 2023-08-29 RX ORDER — DULAGLUTIDE 1.5 MG/.5ML
1.5 INJECTION, SOLUTION SUBCUTANEOUS
COMMUNITY
Start: 2023-08-21

## 2023-08-29 RX ORDER — GLIPIZIDE 5 MG/1
2.5 TABLET ORAL 2 TIMES DAILY
COMMUNITY
Start: 2023-07-18

## 2023-08-29 RX ORDER — ATORVASTATIN CALCIUM 40 MG/1
40 TABLET, FILM COATED ORAL
COMMUNITY
Start: 2023-07-17

## 2023-08-29 NOTE — PROGRESS NOTES
Subjective:     Patient ID: Nilay Ugarte is a 62 y.o. male.    Chief Complaint: Diabetic Foot Exam    Nilay is a 62 y.o. male who presents to the podiatry clinic  with complaint of  left foot pain. Onset of the symptoms was several weeks ago. Precipitating event:  recurrent growth of callus beneath left 2nd toe . Current symptoms include: ability to bear weight, but with some pain and worsening symptoms after a period of activity. Aggravating factors:  barefoot walking . Symptoms have waxed and waned. Patient has had no prior foot problems. Evaluation to date: none. Treatment to date:  routine safe debridement . Patients rates pain 0/10 on pain scale.    Review of Systems   Constitutional: Negative for chills and fever.   Cardiovascular:  Negative for chest pain and leg swelling.   Respiratory:  Negative for cough and shortness of breath.    Gastrointestinal:  Negative for diarrhea, nausea and vomiting.   Neurological:  Positive for paresthesias.        Objective:     Physical Exam  Vitals reviewed.   Constitutional:       General: He is not in acute distress.     Appearance: Normal appearance. He is not ill-appearing.   HENT:      Head: Normocephalic.      Nose: Nose normal.   Cardiovascular:      Rate and Rhythm: Normal rate.   Pulmonary:      Effort: Pulmonary effort is normal. No respiratory distress.   Skin:     Capillary Refill: Capillary refill takes 2 to 3 seconds.   Neurological:      Mental Status: He is alert and oriented to person, place, and time.   Psychiatric:         Mood and Affect: Mood normal.         Behavior: Behavior normal.         Thought Content: Thought content normal.     Neurologic:  Protective and light touch sensation intact bilateral lower extremity, positive paresthesias reported   Musculoskeletal:  5/5 muscle strength noted bilateral foot, ankle joint range of motion is full without pain, multiple contracted digits noted bilateral foot especially left 2nd/hammertoe left 2nd, mild  pain and tenderness with palpation plantar aspect left 2nd MPJ at site of soft tissue lesion   Dermatologic: Nails 1 through 5 bilateral within normal limits of color and thickness, no open lesions noted bilateral foot, no rashes noted bilateral foot, hyperkeratotic skin lesion noted beneath the left 2nd MPJ  Vascular:  DP and PT pulses palpable 2/4 bilateral foot, capillary fill time less than 3 seconds to distal digits bilateral foot      Assessment:      Encounter Diagnoses   Name Primary?    Hammer toe of left foot Yes    Corn or callus     Comprehensive diabetic foot examination, type 2 DM, encounter for      Plan:     Nilay was seen today for diabetic foot exam.    Diagnoses and all orders for this visit:    Hammer toe of left foot    Corn or callus    Comprehensive diabetic foot examination, type 2 DM, encounter for      I counseled the patient on his conditions, their implications and medical management.        1. Patient was examined and evaluated.    2. Patient was advised to continue daily foot monitoring.  Patient continue efforts proper glycemic control, lowering hemoglobin A1c, adherence to diabetic medication regimen.  Patient was advised continue with oral Neurontin for any neuropathic pain symptoms.    3. Discussed with patient etiology of hammertoe deformity.  Discussed conservative treatment options with the patient.  Briefly discussed with patient surgical intervention for hammertoe correction but we will forego until symptoms worsen over time.  Patient was advised to adjust shoe gear for better comfort and fit including increased toe box height and width select shoe gear with soft stretchable uppers.    4. Discussed with patient etiology of callus formation.  Patient made aware that is secondary to excess sess force and friction in the area.  Patient was advised to continue with safe home debridement with emery board, nail file or pumice stone.  Patient had mild debridement of the callus here in  the clinic without incident.  Patient was dispensed offloading aperture pads for prevention of direct contact to the lesion.  Patient was advised to apply ointments/moisturizer to the area for softening purposes.  Patient was advised to potential for recurrence over time.    5. Patient will follow-up in 1 year for annual diabetic foot exam or p.r.n. for complaints

## 2025-07-24 ENCOUNTER — TELEPHONE (OUTPATIENT)
Dept: PODIATRY | Facility: CLINIC | Age: 65
End: 2025-07-24
Payer: MEDICARE